# Patient Record
Sex: FEMALE | Race: WHITE | NOT HISPANIC OR LATINO | Employment: FULL TIME | URBAN - NONMETROPOLITAN AREA
[De-identification: names, ages, dates, MRNs, and addresses within clinical notes are randomized per-mention and may not be internally consistent; named-entity substitution may affect disease eponyms.]

---
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
## 2022-10-14 ENCOUNTER — TRANSCRIBE ORDERS (OUTPATIENT)
Dept: ADMINISTRATIVE | Facility: HOSPITAL | Age: 22
End: 2022-10-14

## 2022-10-14 ENCOUNTER — LAB (OUTPATIENT)
Dept: LAB | Facility: HOSPITAL | Age: 22
End: 2022-10-14

## 2022-10-14 DIAGNOSIS — N91.2 ABSENCE OF MENSTRUATION: Primary | ICD-10-CM

## 2022-10-14 DIAGNOSIS — N91.2 ABSENCE OF MENSTRUATION: ICD-10-CM

## 2022-10-14 LAB — HCG INTACT+B SERPL-ACNC: 2942 MIU/ML

## 2022-10-14 PROCEDURE — 84144 ASSAY OF PROGESTERONE: CPT

## 2022-10-14 PROCEDURE — 84702 CHORIONIC GONADOTROPIN TEST: CPT

## 2022-10-14 PROCEDURE — 36415 COLL VENOUS BLD VENIPUNCTURE: CPT

## 2022-10-15 LAB — PROGEST SERPL-MCNC: 6.59 NG/ML

## 2022-11-01 LAB
EXTERNAL HEPATITIS B SURFACE ANTIGEN: NEGATIVE
EXTERNAL RUBELLA QUALITATIVE: NORMAL
EXTERNAL SYPHILIS RPR SCREEN: NORMAL
HIV1 P24 AG SERPL QL IA: NORMAL

## 2023-05-26 LAB
EXTERNAL CHLAMYDIA SCREEN: NEGATIVE
EXTERNAL GONORRHEA SCREEN: NEGATIVE
EXTERNAL GROUP B STREP ANTIGEN: POSITIVE

## 2023-06-02 ENCOUNTER — HOSPITAL ENCOUNTER (OUTPATIENT)
Facility: HOSPITAL | Age: 23
Discharge: HOME | End: 2023-06-02
Attending: OBSTETRICS & GYNECOLOGY | Admitting: OBSTETRICS & GYNECOLOGY
Payer: COMMERCIAL

## 2023-06-02 VITALS
HEART RATE: 110 BPM | TEMPERATURE: 98.9 F | RESPIRATION RATE: 20 BRPM | HEIGHT: 67 IN | WEIGHT: 265.3 LBS | DIASTOLIC BLOOD PRESSURE: 80 MMHG | SYSTOLIC BLOOD PRESSURE: 148 MMHG | BODY MASS INDEX: 41.64 KG/M2

## 2023-06-02 PROBLEM — Z34.90 PREGNANCY: Status: ACTIVE | Noted: 2023-06-02

## 2023-06-02 PROCEDURE — 59025 FETAL NON-STRESS TEST: CPT

## 2023-06-02 PROCEDURE — G0463 HOSPITAL OUTPT CLINIC VISIT: HCPCS

## 2023-06-02 NOTE — NON STRESS TEST
Lynda aSnto, a  at 37w4d with an VANESSA of 2023, by Ultrasound, was seen at [facility] LABOR DEL[facility] for a nonstress test.    Chief Complaint   Patient presents with   • Non-stress Test     SENT FROM OFFICE FOR FETAL TACHYCARDIA, DENIES ANY CONTRACTIONS, LOF, OR VB AND HAS +FM       Patient Active Problem List   Diagnosis   • Pregnancy       Start Time: 1545  Stop Time: 1615    Interpretation A  Nonstress Test Interpretation A: Reactive  Comments A: verified by Dr. Contreras

## 2023-06-02 NOTE — DISCHARGE INSTRUCTIONS
Keep your regular scheduled office appt. Return to L&D for any signs or symptoms of labor, contractions, leaking of fluid, vaginal bleeding or if you are not feeling your baby move as usual.